# Patient Record
Sex: FEMALE
[De-identification: names, ages, dates, MRNs, and addresses within clinical notes are randomized per-mention and may not be internally consistent; named-entity substitution may affect disease eponyms.]

---

## 2021-02-05 ENCOUNTER — DASHBOARD ENCOUNTERS (OUTPATIENT)
Age: 66
End: 2021-02-05

## 2021-02-05 ENCOUNTER — OFFICE VISIT (OUTPATIENT)
Dept: URBAN - METROPOLITAN AREA CLINIC 113 | Facility: CLINIC | Age: 66
End: 2021-02-05
Payer: COMMERCIAL

## 2021-02-05 ENCOUNTER — WEB ENCOUNTER (OUTPATIENT)
Dept: URBAN - METROPOLITAN AREA CLINIC 113 | Facility: CLINIC | Age: 66
End: 2021-02-05

## 2021-02-05 VITALS
TEMPERATURE: 98.2 F | WEIGHT: 209 LBS | HEIGHT: 65 IN | BODY MASS INDEX: 34.82 KG/M2 | RESPIRATION RATE: 18 BRPM | DIASTOLIC BLOOD PRESSURE: 72 MMHG | SYSTOLIC BLOOD PRESSURE: 138 MMHG | HEART RATE: 46 BPM

## 2021-02-05 DIAGNOSIS — R19.7 DIARRHEA: ICD-10-CM

## 2021-02-05 PROCEDURE — G8483 FLU IMM NO ADMIN DOC REA: HCPCS | Performed by: NURSE PRACTITIONER

## 2021-02-05 PROCEDURE — 99204 OFFICE O/P NEW MOD 45 MIN: CPT | Performed by: NURSE PRACTITIONER

## 2021-02-05 PROCEDURE — 99244 OFF/OP CNSLTJ NEW/EST MOD 40: CPT | Performed by: NURSE PRACTITIONER

## 2021-02-05 NOTE — HPI-TODAY'S VISIT:
65-year-old woman referred by Dr. Nathan for evaluation of nausea and diarrhea.  She is status post laparoscopic paraesophageal hernia repair with gastropexy and EGD on 11/4/20. She had an appendectomy in June 2020.  At follow up with Dr. Nathan, she had been compliant with her bariatric full liquid/soft diet. Stools were soft. On December 12, she started with significant gas. She had cheated on her diet, and had a soda. She then started with diarrhea predominant habits. She added fiber supplement, which did not affect her bowel habits. No recent antobiotics that she can recall. She does work as a nurse at [employer].  Dairy seems to be a contributer.   She has bowel movements 3 or 4 times in the morning. She can have nocturnal defecation. There is no abdominal pain, or cramping. She reports excessive gas, likened to an "earthquake." There is no blood per rectum. Benefiber does help to bulk stool from liquid to piecey. There is occasional nausea on an empty stomach, relieved with eating. A cough drop help withs relieving her nausea. She has lost about 30 pounds since her appendectomy in June. There is rare heartburn or regurgitation. No vomiting. No dysphagia.

## 2021-02-11 ENCOUNTER — TELEPHONE ENCOUNTER (OUTPATIENT)
Dept: URBAN - METROPOLITAN AREA CLINIC 113 | Facility: CLINIC | Age: 66
End: 2021-02-11

## 2021-02-18 ENCOUNTER — TELEPHONE ENCOUNTER (OUTPATIENT)
Dept: URBAN - METROPOLITAN AREA CLINIC 113 | Facility: CLINIC | Age: 66
End: 2021-02-18

## 2021-02-18 RX ORDER — METRONIDAZOLE 500 MG/1
1 TABLET TABLET, FILM COATED ORAL THREE TIMES A DAY
Qty: 30 | OUTPATIENT
Start: 2021-02-18 | End: 2021-02-28

## 2021-02-23 ENCOUNTER — TELEPHONE ENCOUNTER (OUTPATIENT)
Dept: URBAN - METROPOLITAN AREA CLINIC 113 | Facility: CLINIC | Age: 66
End: 2021-02-23

## 2021-04-08 ENCOUNTER — OFFICE VISIT (OUTPATIENT)
Dept: URBAN - METROPOLITAN AREA CLINIC 113 | Facility: CLINIC | Age: 66
End: 2021-04-08